# Patient Record
Sex: MALE | Race: WHITE | NOT HISPANIC OR LATINO | Employment: FULL TIME | URBAN - METROPOLITAN AREA
[De-identification: names, ages, dates, MRNs, and addresses within clinical notes are randomized per-mention and may not be internally consistent; named-entity substitution may affect disease eponyms.]

---

## 2022-01-15 ENCOUNTER — HOSPITAL ENCOUNTER (OUTPATIENT)
Dept: RADIOLOGY | Facility: MEDICAL CENTER | Age: 47
End: 2022-01-15
Attending: NURSE PRACTITIONER
Payer: COMMERCIAL

## 2022-01-15 ENCOUNTER — OFFICE VISIT (OUTPATIENT)
Dept: URGENT CARE | Facility: PHYSICIAN GROUP | Age: 47
End: 2022-01-15
Payer: COMMERCIAL

## 2022-01-15 VITALS
HEIGHT: 73 IN | DIASTOLIC BLOOD PRESSURE: 86 MMHG | BODY MASS INDEX: 20.41 KG/M2 | SYSTOLIC BLOOD PRESSURE: 142 MMHG | RESPIRATION RATE: 14 BRPM | TEMPERATURE: 99.4 F | WEIGHT: 154 LBS | HEART RATE: 104 BPM | OXYGEN SATURATION: 99 %

## 2022-01-15 DIAGNOSIS — R23.8 REDNESS AND SWELLING OF ANKLE: ICD-10-CM

## 2022-01-15 DIAGNOSIS — M25.473 REDNESS AND SWELLING OF ANKLE: ICD-10-CM

## 2022-01-15 PROCEDURE — 99204 OFFICE O/P NEW MOD 45 MIN: CPT | Performed by: NURSE PRACTITIONER

## 2022-01-15 PROCEDURE — 73610 X-RAY EXAM OF ANKLE: CPT | Mod: LT

## 2022-01-15 RX ORDER — CEPHALEXIN 500 MG/1
500 CAPSULE ORAL 4 TIMES DAILY
Qty: 28 CAPSULE | Refills: 0 | Status: SHIPPED | OUTPATIENT
Start: 2022-01-15 | End: 2022-01-22

## 2022-01-15 RX ORDER — METHYLPREDNISOLONE 4 MG/1
TABLET ORAL
Qty: 21 TABLET | Refills: 0 | Status: SHIPPED | OUTPATIENT
Start: 2022-01-15

## 2022-01-15 ASSESSMENT — ENCOUNTER SYMPTOMS
TINGLING: 0
ORTHOPNEA: 0
SENSORY CHANGE: 0
SHORTNESS OF BREATH: 0
VOMITING: 0
MYALGIAS: 1
CONSTIPATION: 0
ABDOMINAL PAIN: 0
PALPITATIONS: 0
WEAKNESS: 0
HEADACHES: 0
NAUSEA: 0
WHEEZING: 0
FEVER: 0
FALLS: 0
DIZZINESS: 0
DIARRHEA: 0
CHILLS: 0

## 2022-01-15 NOTE — PROGRESS NOTES
"Russell Sandoval is a 46 y.o. male who presents with Ankle Swelling (pain )            HPI  States pain, swelling, redness to left ankle x 3 days, worse pain today, unable to weight bear. Currently unable to weight bear this morning due to pain, swelling. Denies trauma, injury or fall. Currently at [place], visiting from [place] of state. [facility]ing in [facility]. Previous history of ankle surgery with hardware placed.      PMH:  has no past medical history on file.  MEDS: No current outpatient medications on file.  ALLERGIES: Not on File  SURGHX: History reviewed. No pertinent surgical history.  SOCHX:    FH: Family history was reviewed, no pertinent findings to report    Review of Systems   Constitutional: Negative for chills, fever and malaise/fatigue.   Respiratory: Negative for shortness of breath and wheezing.    Cardiovascular: Negative for chest pain, palpitations, orthopnea and leg swelling.   Gastrointestinal: Negative for abdominal pain, constipation, diarrhea, nausea and vomiting.   Musculoskeletal: Positive for joint pain and myalgias. Negative for falls.   Skin: Negative for itching and rash.   Neurological: Negative for dizziness, tingling, sensory change, weakness and headaches.   Endo/Heme/Allergies: Negative for environmental allergies.   All other systems reviewed and are negative.             Objective     /86 (BP Location: Left arm, Patient Position: Sitting, BP Cuff Size: Adult)   Pulse (!) 104   Temp 37.4 °C (99.4 °F)   Resp 14   Ht 1.842 m (6' 0.5\")   Wt 69.9 kg (154 lb)   SpO2 99%   BMI 20.60 kg/m²      Physical Exam  Vitals reviewed.   Constitutional:       General: He is awake. He is not in acute distress.     Appearance: Normal appearance. He is well-developed. He is not ill-appearing, toxic-appearing or diaphoretic.   HENT:      Head: Normocephalic.   Eyes:      Pupils: Pupils are equal, round, and reactive to light.   Cardiovascular:      Rate and Rhythm: Normal " rate.   Pulmonary:      Effort: Pulmonary effort is normal.   Musculoskeletal:      Left ankle: Swelling present. No deformity, ecchymosis or lacerations. Tenderness present over the lateral malleolus, medial malleolus and AITF ligament. Decreased range of motion. Normal pulse.      Left Achilles Tendon: Normal.   Skin:     General: Skin is warm and dry.      Findings: Erythema present. No abrasion, bruising, ecchymosis, signs of injury, laceration, rash or wound.      Comments: Erythema to medial and lateral malleolus. Swelling of left ankle joint 2+. Unable to weight bear due to pain.    Neurological:      Mental Status: He is alert and oriented to person, place, and time.   Psychiatric:         Behavior: Behavior is cooperative.                             Assessment & Plan        1. Redness and swelling of ankle    - DX-ANKLE 3+ VIEWS LEFT; Future  - methylPREDNISolone (MEDROL DOSEPAK) 4 MG Tablet Therapy Pack; Follow schedule on package instructions.  Dispense: 21 Tablet; Refill: 0  - cephALEXin (KEFLEX) 500 MG Cap; Take 1 Capsule by mouth 4 times a day for 7 days.  Dispense: 28 Capsule; Refill: 0    Possible gout vs cellulitis, will treat for both at this time   --Avoid triggers for flare ups like alcohol, seafood, meat, processed foods  -Maintain a healthy weight  -Leg elevation, cool compress for swelling or heat if helps pain  -Epsom salt soaks as needed for swelling  -Include the following in diet to decrease gout flare-ups:  ?Low-fat dairy products  ?Foods made with complex carbohydrates (whole grains, brown rice, oats, beans)  ?Only a moderate amount of wine (up to two 5-ounce servings per day [about 300 mL per day] since this is not likely to increase the risk of a gout attack)  ?Coffee (may decrease serum uric acid levels)  ?Vitamin C (500 mg per day has a mild urate-lowering effect (UP-TO-DATE)  Return to UC if symptoms worsen or change    -May use appropriate over the counter NSAID as needed for  discomfort, avoid Ibuprofen products, may use Tylenol   -Wash in neutral pH, non-perfumed soap and lukewarm water, pat dry  -Soak foot in Epsom salts as needed for decreasing swelling  -Elevate foot, cool compresses as needed   -Monitor for increase in redness of skin or spreading up leg, blistering, weeping of sites, increased warmth to skin, fever, malaise- need re-evaluation